# Patient Record
Sex: MALE | Race: OTHER | HISPANIC OR LATINO | URBAN - METROPOLITAN AREA
[De-identification: names, ages, dates, MRNs, and addresses within clinical notes are randomized per-mention and may not be internally consistent; named-entity substitution may affect disease eponyms.]

---

## 2019-09-10 ENCOUNTER — EMERGENCY (EMERGENCY)
Facility: HOSPITAL | Age: 24
LOS: 1 days | Discharge: HOME | End: 2019-09-10
Admitting: EMERGENCY MEDICINE
Payer: OTHER MISCELLANEOUS

## 2019-09-10 VITALS
DIASTOLIC BLOOD PRESSURE: 73 MMHG | TEMPERATURE: 98 F | SYSTOLIC BLOOD PRESSURE: 114 MMHG | WEIGHT: 160.06 LBS | RESPIRATION RATE: 16 BRPM | HEART RATE: 84 BPM | OXYGEN SATURATION: 97 %
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
## 2019-09-10 DIAGNOSIS — S61.214A LACERATION WITHOUT FOREIGN BODY OF RIGHT RING FINGER WITHOUT DAMAGE TO NAIL, INITIAL ENCOUNTER: ICD-10-CM
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
## 2019-09-10 PROCEDURE — 12001 RPR S/N/AX/GEN/TRNK 2.5CM/<: CPT

## 2019-09-10 PROCEDURE — 99282 EMERGENCY DEPT VISIT SF MDM: CPT | Mod: 25

## 2019-09-10 NOTE — ED PROVIDER NOTE - CLINICAL SUMMARY MEDICAL DECISION MAKING FREE TEXT BOX
pt presents c/o finger laceration. cleaned with soap and water pta. tetanus utd. repaired as per procedure note. dressed with bacitracin. will d/c.

## 2019-09-10 NOTE — ED PROVIDER NOTE - SKIN, MLM
Skin normal color for race, warm, dry. No evidence of rash. 1cm curved laceration just distal to PIP of right 4th digit.  bleeding controlled. no subQ tissue visualized.

## 2019-09-10 NOTE — ED PROVIDER NOTE - OBJECTIVE STATEMENT
25yo otherwise healthy M presents c/o laceration of right 4th digit. pt was opening a can at work and accidentally cut himself on the edge of the can. tetanus utd 2 years ago. no numbness, tingling, weakness. washed with soap and water pta. no other concerns or complaitns.

## 2019-09-10 NOTE — ED PROVIDER NOTE - CARE PLAN
Principal Discharge DX:	Laceration of right ring finger without foreign body without damage to nail, initial encounter

## 2019-09-10 NOTE — ED PROVIDER NOTE - NSFOLLOWUPINSTRUCTIONS_ED_ALL_ED_FT
Laceration    A laceration is a cut that goes through all of the layers of the skin and into the tissue that is right under the skin. Some lacerations heal on their own. Others need to be closed with skin adhesive strips, skin glue, stitches (sutures), or staples. Proper laceration care minimizes the risk of infection and helps the laceration to heal better.  If non-absorbable stitches or staples have been placed, they must be taken out within the time frame instructed by your healthcare provider.    SEEK IMMEDIATE MEDICAL CARE IF YOU HAVE ANY OF THE FOLLOWING SYMPTOMS: swelling around the wound, worsening pain, drainage from the wound, red streaking going away from your wound, inability to move finger or toe near the laceration, or discoloration of skin near the laceration.     RETURN TO SUTURE REMOVAL IN 7-10 DAYS.     CHANGE DRESSING DAILY AND APPLY ANTIBIOTIC OINTMENT WITH EACH DRESSING CHANGE.     KEEP WOUND CLEAN AND DRY. DO NOT SUBMERGE BUT OKAY TO WASH HANDS NORMALLY.

## 2019-09-10 NOTE — ED PROVIDER NOTE - PATIENT PORTAL LINK FT
You can access the FollowMyHealth Patient Portal offered by [facility] by registering at the following website: [email or web address]. By joining [employer]’s FollowMyHealth portal, you will also be able to view your health information using other applications (apps) compatible with our system.

## 2019-09-19 ENCOUNTER — EMERGENCY (EMERGENCY)
Facility: HOSPITAL | Age: 24
LOS: 1 days | Discharge: HOME | End: 2019-09-19
Admitting: EMERGENCY MEDICINE
Payer: OTHER MISCELLANEOUS

## 2019-09-19 VITALS
TEMPERATURE: 99 F | HEART RATE: 73 BPM | OXYGEN SATURATION: 98 % | SYSTOLIC BLOOD PRESSURE: 104 MMHG | DIASTOLIC BLOOD PRESSURE: 62 MMHG | RESPIRATION RATE: 16 BRPM

## 2019-09-19 DIAGNOSIS — M79.89 OTHER SPECIFIED SOFT TISSUE DISORDERS: ICD-10-CM
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
## 2019-09-19 DIAGNOSIS — Z48.01 ENCOUNTER FOR CHANGE OR REMOVAL OF SURGICAL WOUND DRESSING: ICD-10-CM

## 2019-09-19 DIAGNOSIS — S61.214D LACERATION WITHOUT FOREIGN BODY OF RIGHT RING FINGER WITHOUT DAMAGE TO NAIL, SUBSEQUENT ENCOUNTER: ICD-10-CM

## 2019-09-19 DIAGNOSIS — Z48.02 ENCOUNTER FOR REMOVAL OF SUTURES: ICD-10-CM

## 2019-09-19 PROBLEM — Z78.9 OTHER SPECIFIED HEALTH STATUS: Chronic | Status: ACTIVE | Noted: 2019-09-10

## 2019-09-19 PROCEDURE — 99283 EMERGENCY DEPT VISIT LOW MDM: CPT

## 2019-09-19 RX ADMIN — Medication 100 MILLIGRAM(S): at 14:30

## 2019-09-19 NOTE — ED PROVIDER NOTE - CLINICAL SUMMARY MEDICAL DECISION MAKING FREE TEXT BOX
patient here for suture removal from right 4th digit, sutures placed 9 days ago, afebrile, sutures clean/dry/intact with no dehiscence  however area of erythema around suture site with mild finger swelling, no drainage or dehiscence, nvi, no motor deficits. may be reactive/inflammatory vs cellulitis, sutures removed, steri strips applied, rx doxycycline, arm elevation, return in 2 days for wound check. patient here for suture removal right 4th digit, sutures placed 9 days ago, afebrile, sutures clean/dry/intact with no dehiscence  however area of erythema around suture site with mild finger swelling, no drainage or dehiscence, nvi, no motor deficits. may be reactive/inflammatory vs cellulitis, sutures removed, steri strips applied, rx doxycycline, arm elevation, return in 2 days for wound check.

## 2019-09-19 NOTE — ED PROVIDER NOTE - PHYSICAL EXAMINATION
CONSTITUTIONAL: Well-developed; well-nourished; in no acute distress.  	RUE: sutures clean/dry/intact to right 4th digit inbetween PIP and DIP dorsal aspect with surrounding erythema to suture site and mild swelling to digit, nontender, able to make a fist. full ROM. neg kanavel's signs. soft compartments. no streaking. radial pulse 2+.   	NEURO: Alert, oriented. Grossly unremarkable.  PSYCH: Cooperative, appropriate.

## 2019-09-19 NOTE — ED ADULT NURSE NOTE - OBJECTIVE STATEMENT
23 YO m presents to ED for suture removal after suture placement last tuesday. Site noted to be red and swollen at thist jose. Pt denies fever, chills, n/v/d cp or sob at this time.

## 2019-09-19 NOTE — ED PROVIDER NOTE - NSFOLLOWUPINSTRUCTIONS_ED_ALL_ED_FT
Arm elevation  Keep wound clean and dry  Do not apply ointments or creams to site  Take doxycycline as prescribed    Follow up with hand specialist within 2-3 days  Please return in 2 days to the emergency department for a wound check    RETURN TO THE EMERGENCY DEPARTMENT FOR WORSENING REDNESS, SWELLING, PAIN, DISCHARGE, OR ANY CONCERNS.

## 2019-09-19 NOTE — ED ADULT NURSE NOTE - CHPI ED NUR SYMPTOMS NEG
no bleeding/no bleeding at site/no purulent drainage/no drainage/no chills/no fever/no pain/no rectal pain

## 2019-09-19 NOTE — ED PROVIDER NOTE - PATIENT PORTAL LINK FT
You can access the FollowMyHealth Patient Portal offered by [facility] by registering at the following website: [email or web address]. By joining [email or web address]’s FollowMyHealth portal, you will also be able to view your health information using other applications (apps) compatible with our system.

## 2019-09-19 NOTE — ED PROVIDER NOTE - OBJECTIVE STATEMENT
24 y o male with no PMHX presents to ED for suture removal. Last seen here on 9/10/19 for lac repair of the Right 4th digit. Tetanus is UTD. Laceration was repaired with 2 sutures. Wound w/ some mild redness and swelling. No fevers, chills, or drainage. 24 y o male with no PMHX presents to ED for suture removal, s/p lac repair of the Right 4th digit 9 days ago, tetanus updated, patietn states he has been placing antibacterial ointment to wound. c/o mild redness and swelling to digit. No fever, chills, or drainage.

## 2019-09-19 NOTE — ED PROVIDER NOTE - CARE PROVIDER_API CALL
Tan Reed)  Plastic Surgery; Surgery  [address] [place] [address]  Phone: [phone]  Fax: [phone]  Follow Up Time:

## 2022-08-23 NOTE — ED ADULT TRIAGE NOTE - NS ED NURSE DIRECT TO ROOM YN
Post-Care Instructions: I reviewed with the patient in detail post-care instructions. Patient is to wear sunprotection, and avoid picking at any of the treated lesions. Pt may apply Vaseline to crusted or scabbing areas. Detail Level: Zone No Show Spray Paint Technique Variable?: Yes Spray Paint Technique: No Total Number Of Lesions Treated: 16 Medical Necessity Information: It is in your best interest to select a reason for this procedure from the list below. All of these items fulfill various CMS LCD requirements except the new and changing color options. Spray Paint Text: The liquid nitrogen was applied to the skin utilizing a spray paint frosting technique. Medical Necessity Clause: This procedure was medically necessary because the lesions that were treated were: Consent: The patient's consent was obtained including but not limited to risks of crusting, scabbing, blistering, scarring, darker or lighter pigmentary change, recurrence, incomplete removal and infection. Duration Of Freeze Thaw-Cycle (Seconds): 0

## 2023-02-13 NOTE — ED ADULT TRIAGE NOTE - NS ED NURSE BANDS TYPE
Pt due to void - 25 ml output. Bladder scanned post void evaluation of 438 ml. Writer called provider for any new orders, provider wanted to wait and observe. Encourage pt to drink fluids.    Name band;
